# Patient Record
Sex: MALE | Race: WHITE | NOT HISPANIC OR LATINO
[De-identification: names, ages, dates, MRNs, and addresses within clinical notes are randomized per-mention and may not be internally consistent; named-entity substitution may affect disease eponyms.]

---

## 2017-01-09 ENCOUNTER — APPOINTMENT (OUTPATIENT)
Dept: CARDIOLOGY | Facility: CLINIC | Age: 67
End: 2017-01-09

## 2017-01-17 ENCOUNTER — OUTPATIENT (OUTPATIENT)
Dept: OUTPATIENT SERVICES | Facility: HOSPITAL | Age: 67
LOS: 1 days | End: 2017-01-17

## 2017-02-09 ENCOUNTER — APPOINTMENT (OUTPATIENT)
Dept: PULMONOLOGY | Facility: CLINIC | Age: 67
End: 2017-02-09

## 2017-03-02 ENCOUNTER — APPOINTMENT (OUTPATIENT)
Dept: CARDIOLOGY | Facility: CLINIC | Age: 67
End: 2017-03-02

## 2017-03-02 VITALS
WEIGHT: 245 LBS | SYSTOLIC BLOOD PRESSURE: 158 MMHG | BODY MASS INDEX: 33.18 KG/M2 | HEIGHT: 72 IN | DIASTOLIC BLOOD PRESSURE: 88 MMHG

## 2017-03-02 DIAGNOSIS — Z86.39 PERSONAL HISTORY OF OTHER ENDOCRINE, NUTRITIONAL AND METABOLIC DISEASE: ICD-10-CM

## 2017-03-02 DIAGNOSIS — Z87.09 PERSONAL HISTORY OF OTHER DISEASES OF THE RESPIRATORY SYSTEM: ICD-10-CM

## 2017-03-02 DIAGNOSIS — Z80.3 FAMILY HISTORY OF MALIGNANT NEOPLASM OF BREAST: ICD-10-CM

## 2017-03-02 DIAGNOSIS — Z78.9 OTHER SPECIFIED HEALTH STATUS: ICD-10-CM

## 2017-03-02 DIAGNOSIS — N40.0 BENIGN PROSTATIC HYPERPLASIA WITHOUT LOWER URINARY TRACT SYMPMS: ICD-10-CM

## 2017-03-02 DIAGNOSIS — Z80.1 FAMILY HISTORY OF MALIGNANT NEOPLASM OF TRACHEA, BRONCHUS AND LUNG: ICD-10-CM

## 2017-03-02 DIAGNOSIS — M54.9 DORSALGIA, UNSPECIFIED: ICD-10-CM

## 2017-03-02 DIAGNOSIS — G89.29 DORSALGIA, UNSPECIFIED: ICD-10-CM

## 2017-03-02 RX ORDER — RAMIPRIL 10 MG/1
10 CAPSULE ORAL DAILY
Refills: 0 | Status: ACTIVE | COMMUNITY

## 2017-03-02 RX ORDER — TAMSULOSIN HYDROCHLORIDE 0.4 MG/1
0.4 CAPSULE ORAL
Refills: 0 | Status: ACTIVE | COMMUNITY

## 2017-03-02 RX ORDER — GLIMEPIRIDE 2 MG/1
2 TABLET ORAL
Refills: 0 | Status: ACTIVE | COMMUNITY

## 2017-03-02 RX ORDER — METFORMIN HYDROCHLORIDE 500 MG/1
500 TABLET, COATED ORAL
Refills: 0 | Status: ACTIVE | COMMUNITY

## 2017-03-02 RX ORDER — ASPIRIN 81 MG/1
81 TABLET, CHEWABLE ORAL DAILY
Refills: 0 | Status: ACTIVE | COMMUNITY

## 2017-04-03 ENCOUNTER — APPOINTMENT (OUTPATIENT)
Dept: CARDIOLOGY | Facility: CLINIC | Age: 67
End: 2017-04-03

## 2017-04-18 ENCOUNTER — OUTPATIENT (OUTPATIENT)
Dept: OUTPATIENT SERVICES | Facility: HOSPITAL | Age: 67
LOS: 1 days | End: 2017-04-18

## 2017-04-24 ENCOUNTER — OUTPATIENT (OUTPATIENT)
Dept: OUTPATIENT SERVICES | Facility: HOSPITAL | Age: 67
LOS: 1 days | End: 2017-04-24
Payer: COMMERCIAL

## 2017-04-24 PROCEDURE — 33282: CPT

## 2017-04-28 ENCOUNTER — APPOINTMENT (OUTPATIENT)
Dept: CARDIOLOGY | Facility: CLINIC | Age: 67
End: 2017-04-28

## 2017-05-25 ENCOUNTER — OUTPATIENT (OUTPATIENT)
Dept: OUTPATIENT SERVICES | Facility: HOSPITAL | Age: 67
LOS: 1 days | End: 2017-05-25

## 2017-06-02 ENCOUNTER — APPOINTMENT (OUTPATIENT)
Dept: CARDIOLOGY | Facility: CLINIC | Age: 67
End: 2017-06-02

## 2017-07-07 ENCOUNTER — APPOINTMENT (OUTPATIENT)
Dept: CARDIOLOGY | Facility: CLINIC | Age: 67
End: 2017-07-07

## 2017-08-11 ENCOUNTER — APPOINTMENT (OUTPATIENT)
Dept: CARDIOLOGY | Facility: CLINIC | Age: 67
End: 2017-08-11
Payer: MEDICARE

## 2017-08-11 PROCEDURE — 93299: CPT

## 2017-08-11 PROCEDURE — 93298 REM INTERROG DEV EVAL SCRMS: CPT

## 2017-08-28 ENCOUNTER — APPOINTMENT (OUTPATIENT)
Dept: CARDIOLOGY | Facility: CLINIC | Age: 67
End: 2017-08-28
Payer: MEDICARE

## 2017-08-28 PROCEDURE — 99214 OFFICE O/P EST MOD 30 MIN: CPT

## 2017-09-15 ENCOUNTER — APPOINTMENT (OUTPATIENT)
Dept: CARDIOLOGY | Facility: CLINIC | Age: 67
End: 2017-09-15
Payer: MEDICARE

## 2017-09-15 PROCEDURE — 93298 REM INTERROG DEV EVAL SCRMS: CPT

## 2017-09-15 PROCEDURE — 93299: CPT

## 2017-10-18 ENCOUNTER — APPOINTMENT (OUTPATIENT)
Dept: CARDIOLOGY | Facility: CLINIC | Age: 67
End: 2017-10-18
Payer: MEDICARE

## 2017-10-18 PROCEDURE — 93299: CPT

## 2017-10-18 PROCEDURE — 93298 REM INTERROG DEV EVAL SCRMS: CPT

## 2017-11-13 ENCOUNTER — OUTPATIENT (OUTPATIENT)
Dept: OUTPATIENT SERVICES | Facility: HOSPITAL | Age: 67
LOS: 1 days | End: 2017-11-13

## 2017-11-22 ENCOUNTER — OUTPATIENT (OUTPATIENT)
Dept: OUTPATIENT SERVICES | Facility: HOSPITAL | Age: 67
LOS: 1 days | End: 2017-11-22

## 2017-11-28 ENCOUNTER — APPOINTMENT (OUTPATIENT)
Dept: CARDIOLOGY | Facility: CLINIC | Age: 67
End: 2017-11-28
Payer: MEDICARE

## 2017-11-28 DIAGNOSIS — Z86.73 PERSONAL HISTORY OF TRANSIENT ISCHEMIC ATTACK (TIA), AND CEREBRAL INFARCTION W/OUT RESIDUAL DEFICITS: ICD-10-CM

## 2017-11-28 PROCEDURE — 93298 REM INTERROG DEV EVAL SCRMS: CPT

## 2017-11-28 PROCEDURE — 93299: CPT

## 2018-01-05 ENCOUNTER — APPOINTMENT (OUTPATIENT)
Dept: CARDIOLOGY | Facility: CLINIC | Age: 68
End: 2018-01-05
Payer: MEDICARE

## 2018-01-05 PROCEDURE — 93298 REM INTERROG DEV EVAL SCRMS: CPT

## 2018-01-05 PROCEDURE — 93299: CPT

## 2018-02-09 ENCOUNTER — APPOINTMENT (OUTPATIENT)
Dept: CARDIOLOGY | Facility: CLINIC | Age: 68
End: 2018-02-09
Payer: MEDICARE

## 2018-02-09 PROCEDURE — 93299: CPT

## 2018-02-09 PROCEDURE — 93298 REM INTERROG DEV EVAL SCRMS: CPT

## 2018-03-07 ENCOUNTER — OUTPATIENT (OUTPATIENT)
Dept: OUTPATIENT SERVICES | Facility: HOSPITAL | Age: 68
LOS: 1 days | End: 2018-03-07

## 2018-03-09 ENCOUNTER — OUTPATIENT (OUTPATIENT)
Dept: OUTPATIENT SERVICES | Facility: HOSPITAL | Age: 68
LOS: 1 days | End: 2018-03-09

## 2018-03-16 ENCOUNTER — APPOINTMENT (OUTPATIENT)
Dept: CARDIOLOGY | Facility: CLINIC | Age: 68
End: 2018-03-16
Payer: MEDICARE

## 2018-03-16 PROCEDURE — 93299: CPT

## 2018-03-16 PROCEDURE — 93298 REM INTERROG DEV EVAL SCRMS: CPT

## 2018-04-24 ENCOUNTER — APPOINTMENT (OUTPATIENT)
Dept: CARDIOLOGY | Facility: CLINIC | Age: 68
End: 2018-04-24
Payer: MEDICARE

## 2018-04-24 PROCEDURE — 93299: CPT

## 2018-04-24 PROCEDURE — 93298 REM INTERROG DEV EVAL SCRMS: CPT

## 2018-05-03 ENCOUNTER — OUTPATIENT (OUTPATIENT)
Dept: OUTPATIENT SERVICES | Facility: HOSPITAL | Age: 68
LOS: 1 days | End: 2018-05-03

## 2018-06-01 ENCOUNTER — APPOINTMENT (OUTPATIENT)
Dept: CARDIOLOGY | Facility: CLINIC | Age: 68
End: 2018-06-01
Payer: MEDICARE

## 2018-06-01 PROCEDURE — 93298 REM INTERROG DEV EVAL SCRMS: CPT

## 2018-06-01 PROCEDURE — 93299: CPT

## 2018-06-04 ENCOUNTER — APPOINTMENT (OUTPATIENT)
Dept: CARDIOLOGY | Facility: CLINIC | Age: 68
End: 2018-06-04
Payer: MEDICARE

## 2018-06-04 ENCOUNTER — NON-APPOINTMENT (OUTPATIENT)
Age: 68
End: 2018-06-04

## 2018-06-04 VITALS
OXYGEN SATURATION: 98 % | WEIGHT: 244 LBS | HEIGHT: 72 IN | HEART RATE: 88 BPM | SYSTOLIC BLOOD PRESSURE: 128 MMHG | BODY MASS INDEX: 33.05 KG/M2 | DIASTOLIC BLOOD PRESSURE: 70 MMHG

## 2018-06-04 PROCEDURE — 93000 ELECTROCARDIOGRAM COMPLETE: CPT

## 2018-06-04 PROCEDURE — 99214 OFFICE O/P EST MOD 30 MIN: CPT

## 2018-07-06 ENCOUNTER — APPOINTMENT (OUTPATIENT)
Dept: CARDIOLOGY | Facility: CLINIC | Age: 68
End: 2018-07-06
Payer: MEDICARE

## 2018-07-06 PROCEDURE — 93299: CPT

## 2018-07-06 PROCEDURE — 93298 REM INTERROG DEV EVAL SCRMS: CPT

## 2018-07-28 PROBLEM — Z86.73 HISTORY OF STROKE: Status: RESOLVED | Noted: 2017-03-02 | Resolved: 2018-07-28

## 2018-08-15 ENCOUNTER — APPOINTMENT (OUTPATIENT)
Dept: CARDIOLOGY | Facility: CLINIC | Age: 68
End: 2018-08-15
Payer: MEDICARE

## 2018-08-15 PROCEDURE — 93299: CPT

## 2018-08-15 PROCEDURE — 93298 REM INTERROG DEV EVAL SCRMS: CPT

## 2018-09-21 ENCOUNTER — APPOINTMENT (OUTPATIENT)
Dept: CARDIOLOGY | Facility: CLINIC | Age: 68
End: 2018-09-21
Payer: MEDICARE

## 2018-09-21 PROCEDURE — 93299: CPT

## 2018-09-21 PROCEDURE — 93298 REM INTERROG DEV EVAL SCRMS: CPT

## 2018-10-26 ENCOUNTER — APPOINTMENT (OUTPATIENT)
Dept: CARDIOLOGY | Facility: CLINIC | Age: 68
End: 2018-10-26
Payer: MEDICARE

## 2018-10-26 PROCEDURE — 93298 REM INTERROG DEV EVAL SCRMS: CPT

## 2018-10-26 PROCEDURE — 93299: CPT

## 2018-11-30 ENCOUNTER — APPOINTMENT (OUTPATIENT)
Dept: CARDIOLOGY | Facility: CLINIC | Age: 68
End: 2018-11-30
Payer: MEDICARE

## 2018-11-30 PROCEDURE — 93299: CPT

## 2018-11-30 PROCEDURE — 93298 REM INTERROG DEV EVAL SCRMS: CPT

## 2018-11-30 NOTE — ASSESSMENT
[FreeTextEntry1] : Remote LNq Summary\par \par 11-30-18 \par Viewed lnq summary 8-13-18 to 9-16-18. \par No events were noted. \par Next summary on PA schedule 32days--cv

## 2018-11-30 NOTE — ADDENDUM
[FreeTextEntry1] : Please note I personally reviewed the above with PA.  I was physically present during the service of the patient and directly involved in the management plan and recommendations of care for the patient.\par

## 2019-01-04 ENCOUNTER — APPOINTMENT (OUTPATIENT)
Dept: CARDIOLOGY | Facility: CLINIC | Age: 69
End: 2019-01-04
Payer: MEDICARE

## 2019-01-04 PROCEDURE — 93299: CPT

## 2019-01-04 PROCEDURE — 93298 REM INTERROG DEV EVAL SCRMS: CPT

## 2019-01-04 NOTE — ASSESSMENT
[FreeTextEntry1] : Remote Lnq Summary\par \par 1-4-19 \par Viewed lnq summary 9-16-18 to 10-16-18. \par No events noted. \par Next summary on PA schedule 32days-cv

## 2019-02-08 ENCOUNTER — APPOINTMENT (OUTPATIENT)
Dept: CARDIOLOGY | Facility: CLINIC | Age: 69
End: 2019-02-08
Payer: MEDICARE

## 2019-02-08 PROCEDURE — 93298 REM INTERROG DEV EVAL SCRMS: CPT

## 2019-02-08 PROCEDURE — 93299: CPT

## 2019-02-08 NOTE — ASSESSMENT
[FreeTextEntry1] : Remote LNq Summary\par \par 2-8-19 \par Viewed lnq summary 10-16-18 to 11-17-18. \par No episodes were noted. \par Next summary on [facility] 32days--cv

## 2019-03-15 ENCOUNTER — OTHER (OUTPATIENT)
Age: 69
End: 2019-03-15

## 2019-03-15 ENCOUNTER — APPOINTMENT (OUTPATIENT)
Dept: CARDIOLOGY | Facility: CLINIC | Age: 69
End: 2019-03-15
Payer: MEDICARE

## 2019-03-15 PROCEDURE — 93298 REM INTERROG DEV EVAL SCRMS: CPT

## 2019-03-15 PROCEDURE — 93299: CPT

## 2019-03-15 NOTE — ASSESSMENT
[FreeTextEntry1] : Remote Lnq Summary\par \par 3-15-19 \par Viewed lnq summary 11-17-18 12-18-18. \par No events were noted. \par Next summary on PA schedule 32days--cv

## 2019-03-29 ENCOUNTER — TRANSCRIPTION ENCOUNTER (OUTPATIENT)
Age: 69
End: 2019-03-29

## 2019-04-19 ENCOUNTER — APPOINTMENT (OUTPATIENT)
Dept: CARDIOLOGY | Facility: CLINIC | Age: 69
End: 2019-04-19
Payer: MEDICARE

## 2019-04-19 PROCEDURE — 93298 REM INTERROG DEV EVAL SCRMS: CPT

## 2019-04-19 PROCEDURE — 93299: CPT

## 2019-04-19 NOTE — ASSESSMENT
[FreeTextEntry1] : Remote Lnq Summary\par \par \par 4-19-19 \par Viewed lnq summary 12-18-18 to 1-18-19. \par No events were noted. \par Next summary on PA schedule 32days--cv

## 2019-05-24 ENCOUNTER — APPOINTMENT (OUTPATIENT)
Dept: CARDIOLOGY | Facility: CLINIC | Age: 69
End: 2019-05-24
Payer: MEDICARE

## 2019-05-24 PROCEDURE — 93299: CPT

## 2019-05-24 PROCEDURE — 93298 REM INTERROG DEV EVAL SCRMS: CPT

## 2019-05-24 NOTE — ASSESSMENT
[FreeTextEntry1] : Remote LNq Summary\par \par \par 5-24-19 \par Viewed lnq summary 1-18-19 to 2-19-19 and 2-19-19 to 3-22-19 (to catch up on automatic cycle, approved by Mallory).\par No events were noted. \par Next summary on PA schedule 32days--cv

## 2019-06-03 ENCOUNTER — APPOINTMENT (OUTPATIENT)
Dept: CARDIOLOGY | Facility: CLINIC | Age: 69
End: 2019-06-03
Payer: MEDICARE

## 2019-06-03 ENCOUNTER — NON-APPOINTMENT (OUTPATIENT)
Age: 69
End: 2019-06-03

## 2019-06-03 VITALS
OXYGEN SATURATION: 95 % | WEIGHT: 238 LBS | HEART RATE: 82 BPM | BODY MASS INDEX: 32.28 KG/M2 | DIASTOLIC BLOOD PRESSURE: 68 MMHG | SYSTOLIC BLOOD PRESSURE: 126 MMHG

## 2019-06-03 PROCEDURE — 99215 OFFICE O/P EST HI 40 MIN: CPT

## 2019-06-03 PROCEDURE — 93000 ELECTROCARDIOGRAM COMPLETE: CPT

## 2019-06-03 NOTE — DISCUSSION/SUMMARY
[FreeTextEntry1] : 68 year old white male comes in for followup consultation with above medical history inactive. Medical problems as noted below.\par #1 coronary artery disease. Abnormal stress myocardial perfusion scan. Multivessel PCI in [place]. Details not available.\par Improved symptoms since then.\par Clinically, no signs of unstable, CAD, CHF.\par requested information from [place].\par Reviewed pathophysiology.\par Aspirin lifelong.\par dual Antiplatelet agents for at least one year. He is not sure about the second medication besides aspirin. He understands importance of taking all the medications as otherwise for prevention of future cardiovascular events. He understands the high risk of stent complication leading to increased morbidity, mortality.\par Continue aggressive lifestyle and respect modifications for prevention of future cardiovascular events have been reviewed and as noted below.\par #2  essential hypertension. Continue present medications. Lifestyle modifications p.o. goal less than 130/80.\par #3 hyperlipidemia. Atorvastatin, increased to 40 mg daily. Goal would be to keep  cholesterol less than 70. role of high intensified therapy of atorvastatin in presence of recent intervention were reviewed. He will repeat CMP, fasting lipid panel in 2 months. He'll contact me for any significant side effects.\par #4 history of CVA.  implantable loop recorder. No significant atrial fibrillation noted.\par #5 obesity. weight reduction. Strongly recommended.\par #6 type 2 diabetes mellitus. Last hemoglobin A1c 7.0. Continue aggressive management of his diabetes mellitus is recommended. Consider use of new diabetic medications, which has more beneficial effect from cardiac point of view. I will defer that to your office, and endocrinologist.\par \par \par Counseling regarding low saturated fat, salt and carbohydrate intake was reviewed. Active lifestyle and regular. Exercise along with weight management is advised.\par All the above were at length reviewed. Answered all the questions. Thank you very much for this kind referral. Please do not hesitate to give me a call for any question.\par Part of this transcription was done with voice recognition software and phonetically similar errors are common. I apologize for that. Please donot hesitate to call for any questions due to above.\par \par Followup is planned in 6 months. Labs prescription given. Prescription done.\par Request for data and information from [place] done.\par He is to also provide me with list of his medications

## 2019-06-03 NOTE — PHYSICAL EXAM
[General Appearance - Well Developed] : well developed [General Appearance - Well Nourished] : well nourished [Well Groomed] : well groomed [Normal Appearance] : normal appearance [No Deformities] : no deformities [General Appearance - In No Acute Distress] : no acute distress [Eyelids - No Xanthelasma] : the eyelids demonstrated no xanthelasmas [Normal Conjunctiva] : the conjunctiva exhibited no abnormalities [No Oral Pallor] : no oral pallor [No Oral Cyanosis] : no oral cyanosis [No Jugular Venous Victoria A Waves] : no jugular venous victoria A waves [Normal Jugular Venous V Waves Present] : normal jugular venous V waves present [Normal Jugular Venous A Waves Present] : normal jugular venous A waves present [Exaggerated Use Of Accessory Muscles For Inspiration] : no accessory muscle use [Respiration, Rhythm And Depth] : normal respiratory rhythm and effort [Heart Sounds] : normal S1 and S2 [Auscultation Breath Sounds / Voice Sounds] : lungs were clear to auscultation bilaterally [Heart Rate And Rhythm] : heart rate and rhythm were normal [Murmurs] : no murmurs present [Arterial Pulses Normal] : the arterial pulses were normal [Veins - Varicosity Changes] : no varicosital changes were noted in the lower extremities [Edema] : no peripheral edema present [Abdomen Tenderness] : non-tender [Abdomen Soft] : soft [Abnormal Walk] : normal gait [Abdomen Mass (___ Cm)] : no abdominal mass palpated [Gait - Sufficient For Exercise Testing] : the gait was sufficient for exercise testing [Nail Clubbing] : no clubbing of the fingernails [Cyanosis, Localized] : no localized cyanosis [Petechial Hemorrhages (___cm)] : no petechial hemorrhages [Skin Color & Pigmentation] : normal skin color and pigmentation [] : no ischemic changes [No Venous Stasis] : no venous stasis [No Skin Ulcers] : no skin ulcer [Skin Lesions] : no skin lesions [Affect] : the affect was normal [Oriented To Time, Place, And Person] : oriented to person, place, and time [No Xanthoma] : no  xanthoma was observed [No Anxiety] : not feeling anxious [Mood] : the mood was normal

## 2019-06-03 NOTE — ASSESSMENT
[FreeTextEntry1] : Reviewed on [place] 3, 2019\par EKG. [place] 3, 2019 normal sinus rhythm. Low voltage complexes. Nonspecific ST-T changes. Indication coronary artery disease. Interpretation by me.\par Labs May 22, 2019. LDL 83, HDL 51, total cholesterol 162, triglycerides 185. Hemoglobin A1c 7.0, potassium 4.5, sodium 137, creatinine 0.61. CBC stable.

## 2019-06-03 NOTE — HISTORY OF PRESENT ILLNESS
[FreeTextEntry1] : Medical history includes\par #1 essential hypertension. Left ventricular hypertrophy. History of CVA. Implantable loop recorder without any significant events in the form of atrial fibrillations.\par Blood pressure has been well controlled without history of congestive heart failure, renal insufficiency.\par His nonsmoker.\par #2 hyperlipidemia. On the lower dosage of atorvastatin at 10 mg p.o.\par #3 obesity.\par #4 type 2 diabetes mellitus. Borderline control.\par #5 nonobstructive carotid atherosclerosis\par #6 coronary artery disease. PCI in [place] after abnormal stress myocardial perfusion scan.\par \par \par

## 2019-06-26 ENCOUNTER — APPOINTMENT (OUTPATIENT)
Dept: CARDIOLOGY | Facility: CLINIC | Age: 69
End: 2019-06-26
Payer: MEDICARE

## 2019-06-26 PROCEDURE — 93298 REM INTERROG DEV EVAL SCRMS: CPT

## 2019-06-26 PROCEDURE — 93299: CPT

## 2019-06-26 NOTE — ASSESSMENT
[FreeTextEntry1] : \par 6-26-19 [facility] LINQ summary:\par DOI : [id number]\par Dx: CVA\par \par  3-22-19 through 4-22-19:\par \par No events are noted. \par \par Next summary 32 days. \par \par \par Sincerely,\par \par Caroline Gibson MSP\par

## 2019-07-02 ENCOUNTER — APPOINTMENT (OUTPATIENT)
Dept: RADIOLOGY | Facility: CLINIC | Age: 69
End: 2019-07-02
Payer: MEDICARE

## 2019-07-02 PROCEDURE — 73562 X-RAY EXAM OF KNEE 3: CPT | Mod: 50

## 2019-07-29 ENCOUNTER — APPOINTMENT (OUTPATIENT)
Dept: CARDIOLOGY | Facility: CLINIC | Age: 69
End: 2019-07-29
Payer: MEDICARE

## 2019-07-29 PROCEDURE — 93298 REM INTERROG DEV EVAL SCRMS: CPT

## 2019-07-29 PROCEDURE — 93299: CPT

## 2019-07-29 NOTE — ASSESSMENT
[FreeTextEntry1] : Remote LNq Summary\par \par 7-29-19\par Viewed lnq summary 4-22-19 to 5-23-19. \par No events were noted. \par NExt summary on PA schedule 32days--cv

## 2019-09-06 ENCOUNTER — APPOINTMENT (OUTPATIENT)
Dept: CARDIOLOGY | Facility: CLINIC | Age: 69
End: 2019-09-06
Payer: MEDICARE

## 2019-09-06 PROCEDURE — 93298 REM INTERROG DEV EVAL SCRMS: CPT

## 2019-09-06 PROCEDURE — 93299: CPT

## 2019-09-08 NOTE — ASSESSMENT
[FreeTextEntry1] : Remote LNq Summary\par \par 9-6-19 \par Viewed lnq summary 5-23-19 to 6-23-19.\par  No events were noted. \par Next summary on pa schedule 32days-cv

## 2019-10-16 ENCOUNTER — APPOINTMENT (OUTPATIENT)
Dept: CARDIOLOGY | Facility: CLINIC | Age: 69
End: 2019-10-16
Payer: MEDICARE

## 2019-10-16 PROCEDURE — 93299: CPT

## 2019-10-16 PROCEDURE — 93298 REM INTERROG DEV EVAL SCRMS: CPT

## 2019-10-16 NOTE — ASSESSMENT
[FreeTextEntry1] : Remote LNq Summary\par \par \par 10-16-19\par Viewed lnq summary 6-23-19 to 7-24-19. \par No events were noted. \par Next summary on pa schedule 32days-cv

## 2019-11-14 ENCOUNTER — APPOINTMENT (OUTPATIENT)
Dept: CARDIOLOGY | Facility: CLINIC | Age: 69
End: 2019-11-14
Payer: MEDICARE

## 2019-11-14 VITALS
HEART RATE: 67 BPM | WEIGHT: 245 LBS | OXYGEN SATURATION: 94 % | HEIGHT: 72 IN | SYSTOLIC BLOOD PRESSURE: 130 MMHG | DIASTOLIC BLOOD PRESSURE: 72 MMHG | BODY MASS INDEX: 33.18 KG/M2

## 2019-11-14 PROCEDURE — 99214 OFFICE O/P EST MOD 30 MIN: CPT

## 2019-11-14 NOTE — PHYSICAL EXAM
[General Appearance - Well Developed] : well developed [Normal Appearance] : normal appearance [General Appearance - Well Nourished] : well nourished [Well Groomed] : well groomed [No Deformities] : no deformities [Normal Conjunctiva] : the conjunctiva exhibited no abnormalities [General Appearance - In No Acute Distress] : no acute distress [No Oral Pallor] : no oral pallor [Eyelids - No Xanthelasma] : the eyelids demonstrated no xanthelasmas [No Oral Cyanosis] : no oral cyanosis [Normal Jugular Venous A Waves Present] : normal jugular venous A waves present [Normal Jugular Venous V Waves Present] : normal jugular venous V waves present [No Jugular Venous Victoria A Waves] : no jugular venous victoria A waves [Respiration, Rhythm And Depth] : normal respiratory rhythm and effort [Exaggerated Use Of Accessory Muscles For Inspiration] : no accessory muscle use [Auscultation Breath Sounds / Voice Sounds] : lungs were clear to auscultation bilaterally [Heart Rate And Rhythm] : heart rate and rhythm were normal [Heart Sounds] : normal S1 and S2 [Murmurs] : no murmurs present [Arterial Pulses Normal] : the arterial pulses were normal [Edema] : no peripheral edema present [Veins - Varicosity Changes] : no varicosital changes were noted in the lower extremities [Abdomen Soft] : soft [Abdomen Tenderness] : non-tender [Abdomen Mass (___ Cm)] : no abdominal mass palpated [Abnormal Walk] : normal gait [Gait - Sufficient For Exercise Testing] : the gait was sufficient for exercise testing [Nail Clubbing] : no clubbing of the fingernails [Cyanosis, Localized] : no localized cyanosis [Petechial Hemorrhages (___cm)] : no petechial hemorrhages [] : no rash [Skin Color & Pigmentation] : normal skin color and pigmentation [Skin Lesions] : no skin lesions [No Venous Stasis] : no venous stasis [No Skin Ulcers] : no skin ulcer [No Xanthoma] : no  xanthoma was observed [Oriented To Time, Place, And Person] : oriented to person, place, and time [Affect] : the affect was normal [Mood] : the mood was normal [No Anxiety] : not feeling anxious

## 2019-11-14 NOTE — ASSESSMENT
[FreeTextEntry1] : Reviewed on [place] 3, 2019\par EKG. [place] 3, 2019 normal sinus rhythm. Low voltage complexes. Nonspecific ST-T changes. Indication coronary artery disease. Interpretation by me.\par Labs May 22, 2019. LDL 83, HDL 51, total cholesterol 162, triglycerides 185. Hemoglobin A1c 7.0, potassium 4.5, sodium 137, creatinine 0.61. CBC stable.\par \par Reviewed 1114 2019. Loop recorder of readings were reviewed. No significant paroxysmal atrial fibrillation

## 2019-11-14 NOTE — DISCUSSION/SUMMARY
[FreeTextEntry1] : 69 year old white male comes in for followup consultation with above medical history And active medical problems as noted\par #1 coronary artery disease. Abnormal stress myocardial perfusion scan. Multivessel PCI in [place]. Appears to be stable without any symptoms. Activity level, limited because of arthritic knee. Exertional dyspnea without any clinical signs of congestive heart failure.\par He will have an echocardiogram for evaluation of ejection fraction left atrial size. Pulmonary pressures and presence of coronary disease, hypertension, possible CVA.\par This will be also important prior to surgical intervention for moderate risk surgery and presence of limitation of activity and exercise.\par \par Aspirin lifelong.\par I increased his statin to 80 mg in attempt to lower LDL cholesterol to less than 70\par Continue aggressive lifestyle and respect modifications for prevention of future cardiovascular events have been reviewed and as noted below.\par #2  essential hypertension. Continue present medications. Lifestyle modifications  goal less than 130/80.\par #3 hyperlipidemia. I increased atorvastatin to 80 mg. Goal is to try to bring the LDL cholesterol to less than 70. If any, side effects. He'll contact me. Labs in about 2-3 months with liver function tests\par #4 history of CVA.  implantable loop recorder. No significant atrial fibrillation noted.\par #5 obesity. weight reduction. Strongly recommended.\par #6 type 2 diabetes mellitus.According to him, uncontrolled. Strongly recommend lowering hemoglobin A1c. Relationship with cardiovascular disease discussed with him at length.\par \par Counseling regarding low saturated fat, salt and carbohydrate intake was reviewed. Active lifestyle and regular. Exercise along with weight management is advised.\par All the above were at length reviewed. Answered all the questions. Thank you very much for this kind referral. Please do not hesitate to give me a call for any question.\par Part of this transcription was done with voice recognition software and phonetically similar errors are common. I apologize for that. Please donot hesitate to call for any questions due to above.\par \par Followup is planned in 6 months.

## 2019-11-14 NOTE — REASON FOR VISIT
[Follow-Up - Clinic] : a clinic follow-up of [Coronary Artery Disease] : coronary artery disease [Hypertension] : hypertension [Hyperlipidemia] : hyperlipidemia [FreeTextEntry1] : 69-year-old white male is seen in the office for consultation To review labs and evaluation, and presence of recent multivessel PCI in [place]. Also, followup on the implantable loop recorder.\par He has been aggressive with fishing, and activity without any complaint of chest pain. He does have exertional dyspnea, but without any PND, orthopnea, pedal edema.\par He is trying to control his carbohydrate intake, but unable to lose weight.\par He also denies any significant palpitation, dizziness, or syncopal event.\par No neurological event.\par Denies any claudication pain.\par According to him. His diabetes is not well controlled\par He has arthritic knee problems. Which is limiting his functional status. Planning to have knee replacement.

## 2019-11-20 ENCOUNTER — APPOINTMENT (OUTPATIENT)
Dept: CARDIOLOGY | Facility: CLINIC | Age: 69
End: 2019-11-20
Payer: MEDICARE

## 2019-11-20 PROCEDURE — 93299: CPT

## 2019-11-20 PROCEDURE — 93298 REM INTERROG DEV EVAL SCRMS: CPT

## 2019-11-20 NOTE — ASSESSMENT
[FreeTextEntry1] : Remote LNq summary\par \par 11-20-19 \par Viewed lnq summary 7-24-19 to 8-24-19. \par No events were noted.\par  Next summary on pa schedule 32days-cv

## 2019-12-04 ENCOUNTER — APPOINTMENT (OUTPATIENT)
Dept: RADIOLOGY | Facility: CLINIC | Age: 69
End: 2019-12-04
Payer: MEDICARE

## 2019-12-04 PROCEDURE — 71046 X-RAY EXAM CHEST 2 VIEWS: CPT

## 2019-12-11 ENCOUNTER — APPOINTMENT (OUTPATIENT)
Dept: CARDIOLOGY | Facility: CLINIC | Age: 69
End: 2019-12-11
Payer: MEDICARE

## 2019-12-11 PROCEDURE — 93306 TTE W/DOPPLER COMPLETE: CPT

## 2019-12-18 ENCOUNTER — APPOINTMENT (OUTPATIENT)
Dept: CARDIOLOGY | Facility: CLINIC | Age: 69
End: 2019-12-18
Payer: MEDICARE

## 2019-12-18 ENCOUNTER — NON-APPOINTMENT (OUTPATIENT)
Age: 69
End: 2019-12-18

## 2019-12-18 VITALS
OXYGEN SATURATION: 96 % | DIASTOLIC BLOOD PRESSURE: 86 MMHG | SYSTOLIC BLOOD PRESSURE: 160 MMHG | WEIGHT: 248 LBS | HEART RATE: 82 BPM | HEIGHT: 72 IN | BODY MASS INDEX: 33.59 KG/M2

## 2019-12-18 PROCEDURE — 99214 OFFICE O/P EST MOD 30 MIN: CPT | Mod: 25

## 2019-12-18 PROCEDURE — 93000 ELECTROCARDIOGRAM COMPLETE: CPT

## 2019-12-18 RX ORDER — CLOPIDOGREL BISULFATE 75 MG/1
75 TABLET, FILM COATED ORAL
Refills: 0 | Status: ACTIVE | COMMUNITY
Start: 2019-12-18

## 2019-12-18 RX ORDER — AMLODIPINE BESYLATE 5 MG/1
5 TABLET ORAL DAILY
Qty: 90 | Refills: 3 | Status: ACTIVE | COMMUNITY
Start: 2019-12-18

## 2019-12-18 RX ORDER — METOPROLOL SUCCINATE 25 MG/1
25 TABLET, EXTENDED RELEASE ORAL DAILY
Qty: 90 | Refills: 3 | Status: ACTIVE | COMMUNITY
Start: 2019-12-18

## 2019-12-18 NOTE — REASON FOR VISIT
[Coronary Artery Disease] : coronary artery disease [Follow-Up - Clinic] : a clinic follow-up of [Hypertension] : hypertension [Hyperlipidemia] : hyperlipidemia [FreeTextEntry1] : 69-year-old white male is seen in the office for consultation To review labs and evaluation, and presence of recent multivessel PCI in [place]. Also, followup on the implantable loop recorder.\par He has been aggressive with fishing, and activity without any complaint of chest pain. He does have exertional dyspnea, but without any PND, orthopnea, pedal edema.\par He is trying to control his carbohydrate intake, but unable to lose weight.\par He also denies any significant palpitation, dizziness, or syncopal event.\par No neurological event.\par Denies any claudication pain.\par According to him. His diabetes is not well controlled\par He has arthritic knee problems. Which is limiting his functional status. Planning to have knee replacement.

## 2019-12-18 NOTE — PHYSICAL EXAM
[General Appearance - Well Developed] : well developed [Normal Appearance] : normal appearance [General Appearance - Well Nourished] : well nourished [Well Groomed] : well groomed [Normal Conjunctiva] : the conjunctiva exhibited no abnormalities [No Deformities] : no deformities [General Appearance - In No Acute Distress] : no acute distress [No Oral Cyanosis] : no oral cyanosis [Eyelids - No Xanthelasma] : the eyelids demonstrated no xanthelasmas [No Oral Pallor] : no oral pallor [Normal Jugular Venous A Waves Present] : normal jugular venous A waves present [No Jugular Venous Victoria A Waves] : no jugular venous victoria A waves [Normal Jugular Venous V Waves Present] : normal jugular venous V waves present [Respiration, Rhythm And Depth] : normal respiratory rhythm and effort [Heart Sounds] : normal S1 and S2 [Auscultation Breath Sounds / Voice Sounds] : lungs were clear to auscultation bilaterally [Heart Rate And Rhythm] : heart rate and rhythm were normal [Exaggerated Use Of Accessory Muscles For Inspiration] : no accessory muscle use [Murmurs] : no murmurs present [Arterial Pulses Normal] : the arterial pulses were normal [Edema] : no peripheral edema present [Veins - Varicosity Changes] : no varicosital changes were noted in the lower extremities [Abdomen Soft] : soft [Abnormal Walk] : normal gait [Gait - Sufficient For Exercise Testing] : the gait was sufficient for exercise testing [Petechial Hemorrhages (___cm)] : no petechial hemorrhages [Cyanosis, Localized] : no localized cyanosis [Nail Clubbing] : no clubbing of the fingernails [] : no rash [Skin Color & Pigmentation] : normal skin color and pigmentation [No Venous Stasis] : no venous stasis [Skin Lesions] : no skin lesions [No Skin Ulcers] : no skin ulcer [Mood] : the mood was normal [Affect] : the affect was normal [No Xanthoma] : no  xanthoma was observed [Oriented To Time, Place, And Person] : oriented to person, place, and time [No Anxiety] : not feeling anxious

## 2019-12-18 NOTE — DISCUSSION/SUMMARY
[FreeTextEntry1] : 69 year old white male comes in for clearance prior to partial right knee replacement on 1/16/2019.\par #1 coronary artery disease. Abnormal stress myocardial perfusion scan. Multivessel PCI in [place] in February 2019. Appears to be stable without any symptoms. Activity level, limited because of arthritic knee.  No exertional dyspnea, chest pain without any clinical signs of congestive heart failure.\par Aspirin lifelong.\par Continue atorvastatin 80 mg in attempt to lower LDL cholesterol to less than 70\par Continue aggressive lifestyle and respect modifications for prevention of future cardiovascular events have been reviewed and as noted below.\par #2  essential hypertension. Continue present medications. Lifestyle modifications  goal less than 130/80.\par #3 hyperlipidemia. I increased atorvastatin to 80 mg. Goal is to try to bring the LDL cholesterol to less than 70. If any, side effects. He'll contact me. Labs in about 2-3 months with liver function tests\par #4 history of CVA.  implantable loop recorder. No significant atrial fibrillation noted.\par #5 obesity. weight reduction. Strongly recommended.\par #6 type 2 diabetes mellitus.According to him, uncontrolled. Strongly recommend lowering hemoglobin A1c. Relationship with cardiovascular disease discussed with him at length.\par \par \par Patient is optimized from a cardiology standpoint to undergo his partial right knee replacement on 1/16/2020 if it can be performed on both Aspirin and Plavix. He had multiple PCI performed the end of February 2019 and should be on dual antiplatelet therapy for 1 year ( until end of February 2020). If surgery is not able to be performed on both Aspirin and Plavix I would recommend this elective surgery [facility] when Plavix can be safely discontinued and patient can have the surgery on Aspirin 81mg daily. \par \par Patient also with loop recorder and states he needs OK for MRI to be done on his back. Please note loop recorders are MRI compatible and he can undergo the MRI of his back.

## 2019-12-20 ENCOUNTER — APPOINTMENT (OUTPATIENT)
Dept: CARDIOLOGY | Facility: CLINIC | Age: 69
End: 2019-12-20
Payer: MEDICARE

## 2019-12-20 PROCEDURE — 93298 REM INTERROG DEV EVAL SCRMS: CPT

## 2019-12-20 PROCEDURE — 93299: CPT

## 2019-12-26 NOTE — ASSESSMENT
[FreeTextEntry1] : Remote LNq Summary\par \par \par 12-20-19 \par Viewed lnq summary 8-24-19 to 9-24-19. \par No events were noted. \par Next summary on pa schedule 32days-cv

## 2020-01-09 ENCOUNTER — RX RENEWAL (OUTPATIENT)
Age: 70
End: 2020-01-09

## 2020-01-24 ENCOUNTER — APPOINTMENT (OUTPATIENT)
Dept: CARDIOLOGY | Facility: CLINIC | Age: 70
End: 2020-01-24
Payer: MEDICARE

## 2020-01-24 PROCEDURE — G2066: CPT

## 2020-01-24 PROCEDURE — 93298 REM INTERROG DEV EVAL SCRMS: CPT

## 2020-01-24 NOTE — ASSESSMENT
[FreeTextEntry1] : Remote Lnq Summary\par \par 1-24-20 \par Viewed lnq summary 9-24-19 to 10-22-19. \par No events were noted. \par Next summary on pa schedule 32days-cv

## 2020-02-28 ENCOUNTER — APPOINTMENT (OUTPATIENT)
Dept: CARDIOLOGY | Facility: CLINIC | Age: 70
End: 2020-02-28
Payer: MEDICARE

## 2020-02-28 PROCEDURE — G2066: CPT

## 2020-02-28 PROCEDURE — 93298 REM INTERROG DEV EVAL SCRMS: CPT

## 2020-02-28 NOTE — ASSESSMENT
[FreeTextEntry1] : Remote Lnq summary\par \par 2-28-20 \par Viewed lnq summary 10-22-19 to 11-26-19 and 11-26-19 to 12-27-19 (to catch up on automatic cycle, approved by Mallory). \par No events were noted. \par Next summary on pa schedule 32days-cv

## 2020-04-02 ENCOUNTER — APPOINTMENT (OUTPATIENT)
Dept: CARDIOLOGY | Facility: CLINIC | Age: 70
End: 2020-04-02
Payer: MEDICARE

## 2020-04-02 PROCEDURE — 93298 REM INTERROG DEV EVAL SCRMS: CPT

## 2020-04-02 PROCEDURE — G2066: CPT

## 2020-04-02 NOTE — ASSESSMENT
[FreeTextEntry1] : Remote LNq Summary\par \par \par 4-2-20 \par Viewed lnq Summary 12-27-19 to 1-26-20.\par  No events were noted.\par  Next summary on pa schedule 32days-cv\par

## 2020-04-07 ENCOUNTER — APPOINTMENT (OUTPATIENT)
Dept: CARDIOLOGY | Facility: CLINIC | Age: 70
End: 2020-04-07

## 2020-05-07 ENCOUNTER — APPOINTMENT (OUTPATIENT)
Dept: CARDIOLOGY | Facility: CLINIC | Age: 70
End: 2020-05-07
Payer: MEDICARE

## 2020-05-07 PROCEDURE — 93298 REM INTERROG DEV EVAL SCRMS: CPT

## 2020-05-07 PROCEDURE — G2066: CPT

## 2020-05-07 NOTE — ASSESSMENT
[FreeTextEntry1] : Remote LNq Summary\par \par 5-7-20 \par Viewed lnq summary 1-26-20 to 2-27-20. \par No events were noted. \par Next summary on pa schedule 32days-cv

## 2020-06-15 ENCOUNTER — APPOINTMENT (OUTPATIENT)
Dept: CARDIOLOGY | Facility: CLINIC | Age: 70
End: 2020-06-15
Payer: MEDICARE

## 2020-06-15 VITALS
HEIGHT: 72 IN | OXYGEN SATURATION: 98 % | TEMPERATURE: 98 F | DIASTOLIC BLOOD PRESSURE: 68 MMHG | WEIGHT: 238 LBS | SYSTOLIC BLOOD PRESSURE: 130 MMHG | BODY MASS INDEX: 32.23 KG/M2 | HEART RATE: 72 BPM

## 2020-06-15 PROCEDURE — 99214 OFFICE O/P EST MOD 30 MIN: CPT

## 2020-06-15 NOTE — DISCUSSION/SUMMARY
[FreeTextEntry1] : 69 year old white male comes in for followup consultation with above medical history And active medical problems as noted\par #1 coronary artery disease. Abnormal stress myocardial perfusion scan. Multivessel PCI in [place]. Appears to be stable without any symptoms. Activity level, limited because of arthritic knee. Exertional dyspnea without any clinical signs of congestive heart failure.\par Clinically stable.\par Aspirin lifelong.  On dual antiplatelet agent because of high risk nature of his coronary artery disease with multivessel PCI and diabetes.  No significant bleeding complications.  High risk medication use.  Follow closely.\par Continue atorvastatin 80 mg.  Will obtain labs which were done in your office.  Goal for LDL cholesterol less than 70\par Continue aggressive lifestyle and respect modifications for prevention of future cardiovascular events have been reviewed and as noted below.\par #2  essential hypertension.  No CHF.  No history of CKD.  Non-smoker.  Continue present medications. Lifestyle modifications  goal less than 130/80.\par #3 hyperlipidemia. I increased atorvastatin to 80 mg. Goal is to try to bring the LDL cholesterol to less than 70. If any, side effects. He'll contact me. Labs in about 2-3 months with liver function tests\par #4 history of CVA.  implantable loop recorder. No significant atrial fibrillation noted.  Continue to follow\par #5 obesity. weight reduction. Strongly recommended.\par #6 type 2 diabetes mellitus.According to him, uncontrolled. Strongly recommend lowering hemoglobin A1c to less than 7.  Continue follow-up with your office regarding management.  Relationship with cardiovascular disease discussed with him at length.\par \par Counseling regarding low saturated fat, salt and carbohydrate intake was reviewed. Active lifestyle and regular. Exercise along with weight management is advised.\par All the above were at length reviewed. Answered all the questions. Thank you very much for this kind referral. Please do not hesitate to give me a call for any question.\par Part of this transcription was done with voice recognition software and phonetically similar errors are common. I apologize for that. Please donot hesitate to call for any questions due to above.\par \par Followup is planned in 6 months.

## 2020-06-15 NOTE — PHYSICAL EXAM
[General Appearance - Well Developed] : well developed [Normal Appearance] : normal appearance [Well Groomed] : well groomed [General Appearance - Well Nourished] : well nourished [No Deformities] : no deformities [General Appearance - In No Acute Distress] : no acute distress [Normal Conjunctiva] : the conjunctiva exhibited no abnormalities [Eyelids - No Xanthelasma] : the eyelids demonstrated no xanthelasmas [No Oral Cyanosis] : no oral cyanosis [No Oral Pallor] : no oral pallor [No Jugular Venous Victoria A Waves] : no jugular venous victoria A waves [Normal Jugular Venous A Waves Present] : normal jugular venous A waves present [Normal Jugular Venous V Waves Present] : normal jugular venous V waves present [Respiration, Rhythm And Depth] : normal respiratory rhythm and effort [Exaggerated Use Of Accessory Muscles For Inspiration] : no accessory muscle use [Auscultation Breath Sounds / Voice Sounds] : lungs were clear to auscultation bilaterally [Heart Sounds] : normal S1 and S2 [Heart Rate And Rhythm] : heart rate and rhythm were normal [Edema] : no peripheral edema present [Arterial Pulses Normal] : the arterial pulses were normal [Murmurs] : no murmurs present [Veins - Varicosity Changes] : no varicosital changes were noted in the lower extremities [Abdomen Soft] : soft [Abdomen Mass (___ Cm)] : no abdominal mass palpated [Abdomen Tenderness] : non-tender [FreeTextEntry1] : Arthritic gait [Nail Clubbing] : no clubbing of the fingernails [Petechial Hemorrhages (___cm)] : no petechial hemorrhages [Cyanosis, Localized] : no localized cyanosis [Skin Color & Pigmentation] : normal skin color and pigmentation [No Venous Stasis] : no venous stasis [Skin Lesions] : no skin lesions [] : no rash [No Skin Ulcers] : no skin ulcer [No Xanthoma] : no  xanthoma was observed [Oriented To Time, Place, And Person] : oriented to person, place, and time [Affect] : the affect was normal [No Anxiety] : not feeling anxious [Mood] : the mood was normal

## 2020-06-15 NOTE — REASON FOR VISIT
[Follow-Up - Clinic] : a clinic follow-up of [Coronary Artery Disease] : coronary artery disease [Hypertension] : hypertension [Hyperlipidemia] : hyperlipidemia [FreeTextEntry1] : 69-year-old gentleman.  Since last seen comes in for follow-up on labs, implantable loop recorder.\par He has been aggressive with fishing, and activity without any complaint of chest pain. He does have exertional dyspnea, but without any PND, orthopnea, pedal edema.\par He is trying to control his carbohydrate intake, but unable to lose weight.\par He also denies any significant palpitation, dizziness, or syncopal event.\par No neurological event.\par Denies any claudication pain.\par According to him. His diabetes is not well controlled\par He has arthritic knee problems. Which is limiting his functional status. Planning to have knee replacement probably in November.

## 2020-06-16 ENCOUNTER — APPOINTMENT (OUTPATIENT)
Dept: CARDIOLOGY | Facility: CLINIC | Age: 70
End: 2020-06-16
Payer: MEDICARE

## 2020-06-16 PROCEDURE — G2066: CPT

## 2020-06-16 PROCEDURE — 93298 REM INTERROG DEV EVAL SCRMS: CPT

## 2020-06-16 NOTE — ASSESSMENT
[FreeTextEntry1] : Remote Lnq Summary\par \par \par 6-16-20 \par Viewed lnq summary 2-27-20 to 3-28-20. \par No events were noted.\par  Next summary on pa schedule 32d-cv

## 2020-07-21 ENCOUNTER — APPOINTMENT (OUTPATIENT)
Dept: CARDIOLOGY | Facility: CLINIC | Age: 70
End: 2020-07-21
Payer: MEDICARE

## 2020-07-21 PROCEDURE — G2066: CPT

## 2020-07-21 PROCEDURE — 93298 REM INTERROG DEV EVAL SCRMS: CPT

## 2020-07-21 NOTE — ASSESSMENT
[FreeTextEntry1] : Remote LNq summary\par \par 7-21-20 \par Viewed lnq summary 3-28-20 to 4-29-20. \par No events were noted.\par  Next summary on pa schedule 32days-cv

## 2020-08-25 ENCOUNTER — APPOINTMENT (OUTPATIENT)
Dept: CARDIOLOGY | Facility: CLINIC | Age: 70
End: 2020-08-25
Payer: MEDICARE

## 2020-08-25 PROCEDURE — G2066: CPT

## 2020-08-25 PROCEDURE — 93298 REM INTERROG DEV EVAL SCRMS: CPT

## 2020-08-25 NOTE — ASSESSMENT
[FreeTextEntry1] : Remote LNq summary\par \par \par 8-25-20 \par \par Viewed lnq summary 4-29-20 to 5-30-20, 5-30-20 to 6-30-20 and 6-30-20 to 8-4-20 (approved by admin). \par Event labeled AF, noted p waves, r/w PD no AF, noted pvc's and v-bigeminy. \par Next summary on pa schedule 32d-cv\par

## 2020-09-29 DIAGNOSIS — R79.89 OTHER SPECIFIED ABNORMAL FINDINGS OF BLOOD CHEMISTRY: ICD-10-CM

## 2020-10-01 ENCOUNTER — RECORD ABSTRACTING (OUTPATIENT)
Age: 70
End: 2020-10-01

## 2020-10-05 ENCOUNTER — APPOINTMENT (OUTPATIENT)
Dept: CARDIOLOGY | Facility: CLINIC | Age: 70
End: 2020-10-05
Payer: MEDICARE

## 2020-10-05 ENCOUNTER — NON-APPOINTMENT (OUTPATIENT)
Age: 70
End: 2020-10-05

## 2020-10-05 VITALS
HEART RATE: 77 BPM | WEIGHT: 240 LBS | OXYGEN SATURATION: 97 % | SYSTOLIC BLOOD PRESSURE: 122 MMHG | HEIGHT: 72 IN | BODY MASS INDEX: 32.51 KG/M2 | DIASTOLIC BLOOD PRESSURE: 64 MMHG

## 2020-10-05 PROCEDURE — 93000 ELECTROCARDIOGRAM COMPLETE: CPT

## 2020-10-05 PROCEDURE — 99214 OFFICE O/P EST MOD 30 MIN: CPT

## 2020-10-05 RX ORDER — NITROGLYCERIN 0.4 MG/1
0.4 TABLET SUBLINGUAL
Qty: 21 | Refills: 0 | Status: ACTIVE | COMMUNITY
Start: 2020-10-05 | End: 1900-01-01

## 2020-10-05 NOTE — REVIEW OF SYSTEMS
[Negative] : Heme/Lymph [see HPI] : see HPI [Shortness Of Breath] : shortness of breath [Chest Pain] : chest pain [FreeTextEntry1] : Lightheadedness

## 2020-10-05 NOTE — PHYSICAL EXAM
[General Appearance - Well Developed] : well developed [Normal Appearance] : normal appearance [Well Groomed] : well groomed [General Appearance - Well Nourished] : well nourished [No Deformities] : no deformities [General Appearance - In No Acute Distress] : no acute distress [Normal Conjunctiva] : the conjunctiva exhibited no abnormalities [Eyelids - No Xanthelasma] : the eyelids demonstrated no xanthelasmas [Normal Oral Mucosa] : normal oral mucosa [No Oral Pallor] : no oral pallor [No Oral Cyanosis] : no oral cyanosis [Normal Jugular Venous A Waves Present] : normal jugular venous A waves present [Normal Jugular Venous V Waves Present] : normal jugular venous V waves present [No Jugular Venous Victoria A Waves] : no jugular venous victoria A waves [Respiration, Rhythm And Depth] : normal respiratory rhythm and effort [Exaggerated Use Of Accessory Muscles For Inspiration] : no accessory muscle use [Auscultation Breath Sounds / Voice Sounds] : lungs were clear to auscultation bilaterally [Heart Rate And Rhythm] : heart rate and rhythm were normal [Heart Sounds] : normal S1 and S2 [Murmurs] : no murmurs present [Abdomen Soft] : soft [Abdomen Tenderness] : non-tender [Abdomen Mass (___ Cm)] : no abdominal mass palpated [Abnormal Walk] : normal gait [Gait - Sufficient For Exercise Testing] : the gait was sufficient for exercise testing [Nail Clubbing] : no clubbing of the fingernails [Cyanosis, Localized] : no localized cyanosis [Petechial Hemorrhages (___cm)] : no petechial hemorrhages [Skin Color & Pigmentation] : normal skin color and pigmentation [] : no rash [No Venous Stasis] : no venous stasis [Skin Lesions] : no skin lesions [No Skin Ulcers] : no skin ulcer [No Xanthoma] : no  xanthoma was observed [Oriented To Time, Place, And Person] : oriented to person, place, and time [Affect] : the affect was normal [Mood] : the mood was normal [No Anxiety] : not feeling anxious

## 2020-10-05 NOTE — HISTORY OF PRESENT ILLNESS
[FreeTextEntry1] : \par SANCHEZ DAMON is a 70 year old male with a PMH of:\par \par \par Medical history includes\par #1 essential hypertension. Left ventricular hypertrophy. History of CVA. Implantable loop recorder without any significant events in the form of atrial fibrillations.\par Blood pressure has been well controlled without history of congestive heart failure, renal insufficiency.\par His nonsmoker.\par #2 hyperlipidemia. On the lower dosage of atorvastatin at 10 mg p.o.\par #3 obesity.\par #4 type 2 diabetes mellitus. Borderline control.\par #5 nonobstructive carotid atherosclerosis\par #6 coronary artery disease. Multiple PCI in [place] after abnormal stress myocardial perfusion scan.\par Has ILR.\par #7 He has arthritic knee problems. Which is limiting his functional status. Planning to have knee replacement probably in November. \par \par Last seen by Dr. Mendoza 6/15/20. Battery RRT 7/26/20. No events since last seen. Reports chest pain with exertion (nonradiating), CAMPUZANO, and lightheadedness with activities. He denies palpitations, orthopnea, LE edema, lightheadedness, dizziness, near syncope or syncope. 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 Former smoker. Quit 50 years ago. \par \par Testing:\par \par EKG 10/5/20: SR at 69 bpm, DC interval 184 ms, QTc 405 ms, Q waves in aVL and V1, nonspecific ST-T wave abnormalities \par \par Labs 4/3/20: CRP 11, ESR 44, TSH 2.660Chol 148, HDL 37, LDL 41, Trigs 352, AST 24, ALT 20, Cr 0.68, K 4.4, Hgb 11.8, HCT 38.1\par \par Echo 12/11/19:  EF 60-65%. Aortic root 4.4 cm. Ascending aorta 3.9 cm. Minimal to mild VHD. Mildly dilated LA. Normal wall motion. Mild AI noted since 10/26/16.\par \par Reviewed on Ondina 3, 2019\par EKG. Ondina 3, 2019 normal sinus rhythm. Low voltage complexes. Nonspecific ST-T changes. Indication coronary artery disease. Interpretation by me.\par \par Labs May 22, 2019. LDL 83, HDL 51, total cholesterol 162, triglycerides 185. Hemoglobin A1c 7.0, potassium 4.5, sodium 137, creatinine 0.61. CBC stable.\par \par Reviewed 11/14/2019. Loop recorder of readings were reviewed. No significant paroxysmal atrial fibrillation. \par \par Carotids 2016: Scattered atheromatous plaque at the origin of the right and left internal carotid arteries without hemodynamically significant stenosis.\par \par

## 2020-10-05 NOTE — ASSESSMENT
[FreeTextEntry1] : SANCHEZ DAMON is a 70 year old M who presents today Oct 05, 2020 with the above history and the following active issues:\par \par #1 coronary artery disease s/p multivessel PCI now with sx's of exertional CP and CAMPUZANO, and lightheadedness. Script for Nitro SL given. Obtain 2d echocardiogram to evaluate resting heart structure, valvular function, and LVEF. \par Obtain a nuclear stress test to evaluate for evidence of myocardial ischemia. This will be done off Metoprolol.\par \par Aspirin lifelong. On dual antiplatelet agent because of high risk nature of his coronary artery disease with multivessel PCI and diabetes. No significant bleeding complications. High risk medication use. Follow closely.\par Continue atorvastatin 80 mg. Goal for LDL cholesterol less than 70.\par \par Continue aggressive lifestyle and respect modifications for prevention of future cardiovascular events have been reviewed and as noted below.\par \par #2 essential hypertension. No CHF. No history of CKD. Non-smoker. Continue present medications. Lifestyle modifications goal less than 130/80.\par \par #3 hyperlipidemia. atorvastatin to 80 mg. Goal is to try to bring the LDL cholesterol to less than 70. If any, side effects. \par \par Obtain carotid ultrasound to evaluate for evidence of significant carotid atherosclerosis or obstruction. \par Obtain abdominal ultrasound to evaluate for aortic aneurysm or significant aortic atherosclerosis. \par \par #4 history of CVA. implantable loop recorder. No significant atrial fibrillation noted. LINQ at EOS as of 7/26/20. Recommend explant and reimplant. EP referral given. Patient states he would like to think about it.\par \par #5 obesity. weight reduction. Strongly recommended.\par \par #6 type 2 diabetes mellitus. According to him, uncontrolled. Strongly recommend lowering hemoglobin A1c to less than \par \par 7. Continue follow-up with your office regarding management. Relationship with cardiovascular disease discussed with him at length.\par \par F/U after testing to review results (echo, carotid, abd, nuclear stress test).\par Discussed red flag symptoms, which would warrant sooner or emergent medical evaluation.\par Any questions and concerns were addressed and resolved.\par \par Sincerely,\par Mireille Walls [facility]-BC\par Patient's history, testing, and plan was reviewed with supervising physician, Dr. Teresa Mendoza.\par

## 2020-10-06 ENCOUNTER — APPOINTMENT (OUTPATIENT)
Dept: CARDIOLOGY | Facility: CLINIC | Age: 70
End: 2020-10-06
Payer: MEDICARE

## 2020-10-06 PROCEDURE — 93298 REM INTERROG DEV EVAL SCRMS: CPT

## 2020-10-06 PROCEDURE — G2066: CPT

## 2020-10-06 NOTE — ASSESSMENT
[FreeTextEntry1] : Remote LQN Summary\par \par \par 10-6-20 \par Viewed lnq summary 8-4-20 to 8-28-20. no events were noted.\par  Hit EOS on 8/17 (per past notes, pt plan to discuss explant at ov in dec).  RP 12/7 (RP made aware)\par Will leave on [facility] until explanted. \par Next summary 32d-cv

## 2020-11-10 ENCOUNTER — APPOINTMENT (OUTPATIENT)
Dept: CARDIOLOGY | Facility: CLINIC | Age: 70
End: 2020-11-10
Payer: MEDICARE

## 2020-11-10 PROCEDURE — 93298 REM INTERROG DEV EVAL SCRMS: CPT

## 2020-11-10 PROCEDURE — G2066: CPT

## 2020-11-10 NOTE — ASSESSMENT
[FreeTextEntry1] : Remote LNq Summary\par \par \par 11-10-20 \par Viewed lnq summary 8-28-20 to 9-14-20. \par No events were noted. \par Pending f/u with RJ 11/16\par Next summary on pa schedule 32d-cv

## 2020-11-12 ENCOUNTER — RX RENEWAL (OUTPATIENT)
Age: 70
End: 2020-11-12

## 2020-11-12 RX ORDER — ATORVASTATIN CALCIUM 80 MG/1
80 TABLET, FILM COATED ORAL
Qty: 90 | Refills: 3 | Status: ACTIVE | COMMUNITY
Start: 2020-11-12 | End: 1900-01-01

## 2020-11-13 ENCOUNTER — APPOINTMENT (OUTPATIENT)
Dept: CARDIOLOGY | Facility: CLINIC | Age: 70
End: 2020-11-13
Payer: MEDICARE

## 2020-11-13 PROCEDURE — A9502: CPT

## 2020-11-13 PROCEDURE — 93880 EXTRACRANIAL BILAT STUDY: CPT

## 2020-11-13 PROCEDURE — 78452 HT MUSCLE IMAGE SPECT MULT: CPT

## 2020-11-13 PROCEDURE — 93306 TTE W/DOPPLER COMPLETE: CPT

## 2020-11-13 PROCEDURE — 93015 CV STRESS TEST SUPVJ I&R: CPT

## 2020-11-13 PROCEDURE — 93979 VASCULAR STUDY: CPT

## 2020-11-16 ENCOUNTER — APPOINTMENT (OUTPATIENT)
Dept: CARDIOLOGY | Facility: CLINIC | Age: 70
End: 2020-11-16
Payer: MEDICARE

## 2020-11-16 ENCOUNTER — APPOINTMENT (OUTPATIENT)
Dept: ELECTROPHYSIOLOGY | Facility: CLINIC | Age: 70
End: 2020-11-16
Payer: MEDICARE

## 2020-11-16 VITALS
BODY MASS INDEX: 33.32 KG/M2 | DIASTOLIC BLOOD PRESSURE: 84 MMHG | HEIGHT: 72 IN | HEART RATE: 92 BPM | SYSTOLIC BLOOD PRESSURE: 158 MMHG | OXYGEN SATURATION: 97 % | TEMPERATURE: 97.3 F | WEIGHT: 246 LBS

## 2020-11-16 VITALS
WEIGHT: 246 LBS | HEIGHT: 72 IN | OXYGEN SATURATION: 97 % | HEART RATE: 89 BPM | BODY MASS INDEX: 33.32 KG/M2 | DIASTOLIC BLOOD PRESSURE: 70 MMHG | SYSTOLIC BLOOD PRESSURE: 142 MMHG

## 2020-11-16 PROCEDURE — 99214 OFFICE O/P EST MOD 30 MIN: CPT

## 2020-11-16 PROCEDURE — 99203 OFFICE O/P NEW LOW 30 MIN: CPT

## 2020-11-16 PROCEDURE — 93000 ELECTROCARDIOGRAM COMPLETE: CPT

## 2020-11-16 NOTE — HISTORY OF PRESENT ILLNESS
[FreeTextEntry1] : SANCHEZ DAMON is a 70 year old male with a PMH of:\par \par \par Medical history includes\par #1 essential hypertension. Left ventricular hypertrophy. History of CVA. Implantable loop recorder without any significant events in the form of atrial fibrillations.\par Blood pressure has been well controlled without history of congestive heart failure, renal insufficiency.\par His nonsmoker.\par #2 hyperlipidemia. On the lower dosage of atorvastatin at 10 mg p.o.\par #3 obesity.\par #4 type 2 diabetes mellitus. Borderline control.\par #5 nonobstructive carotid atherosclerosis\par #6 coronary artery disease. Multiple PCI in [place] after abnormal stress myocardial perfusion scan.\par Has ILR.\par #7 He has arthritic knee problems. Which is limiting his functional status. Planning to have knee replacement probably in November. \par \par \par Last seen 10/5/20. With complaints of CP, CAMPUZANO, and lightheadedness. Nitro SL given. Nuke, echo, Carotid, and abd ordered. Presents today, 11/16/20 to discuss results. See details below. He still reports some SOB, fatigue, fluttering in his chest, and dizziness at times. He states some of these sx's are similar to how he felt prior to his stents in [place]. Of note, LINTIESHA is at EOS and he has an apt with Dr. Beltran today to discuss explant and re-implent. (No events noted on summaries 10/6/20 and 11/10/20). 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 Mild MR, mild AR, aortic root 4.6 cm (mod dilated), ascending aorta 4.1 cm (mild dilated), aortic arch 3.3cm, moderately dilated LA, normal wall motion. a linear structure compatible with ca Chiari-network is identified in the right atrium (normal varient), mild TR. Increase in aortic root and ascending aorta size, MR, and LA volume since 12/11/19.\par \par Cartoid u/s 11/13/20: Nonobstructive dz.\par \par Abd u/s 11/13/20: No AAAA. There is an abd hernia repair mesh seen in the mid distal portion. Mild plaque seen throughout abd aorta.\par \par Nuclear stress test 11/13/20: Lexiscan. No evidence of ischemia by EKG.Occasional PVCs. Medium sized, moderate defect in inferior wall that is dixed, suggestive of diaphragmatic attenuation artifact. EF 50%.\par \par EKG 10/5/20: SR at 69 bpm, CO interval 184 ms, QTc 405 ms, Q waves in aVL and V1, nonspecific ST-T wave abnormalities \par \par Labs 4/3/20: CRP 11, ESR 44, TSH 2.660Chol 148, HDL 37, LDL 41, Trigs 352, AST 24, ALT 20, Cr 0.68, K 4.4, Hgb 11.8, HCT 38.1\par \par Echo 12/11/19: EF 60-65%. Aortic root 4.4 cm. Ascending aorta 3.9 cm. Minimal to mild VHD. Mildly dilated LA. Normal wall motion. Mild AI noted since 10/26/16.\par \par Reviewed on Ondina 3, 2019\par EKG. Ondina 3, 2019 normal sinus rhythm. Low voltage complexes. Nonspecific ST-T changes. Indication coronary artery disease. Interpretation by me.\par \par Labs May 22, 2019. LDL 83, HDL 51, total cholesterol 162, triglycerides 185. Hemoglobin A1c 7.0, potassium 4.5, sodium 137, creatinine 0.61. CBC stable.\par \par Reviewed 11/14/2019. Loop recorder of readings were reviewed. No significant paroxysmal atrial fibrillation. \par \par Carotids 2016: Scattered atheromatous plaque at the origin of the right and left internal carotid arteries without hemodynamically significant stenosis.\par \par

## 2020-11-16 NOTE — REVIEW OF SYSTEMS
[Negative] : Heme/Lymph [see HPI] : see HPI [Shortness Of Breath] : shortness of breath [Dyspnea on exertion] : dyspnea during exertion [Palpitations] : palpitations

## 2020-11-16 NOTE — ASSESSMENT
[FreeTextEntry1] : SANCHEZ DAMON is a 70 year old M who presents today Nov 16, 2020 with the above history and the following active issues:\par \par \par \par #1 coronary artery disease s/p multivessel PCI now with sx's of exertional CP and CAMPUZANO, and lightheadedness. Nuclear stress test with medium/moderate fixed defect and negative for ischemia, however patient is still symptomatic with complaints of SOB, fatigue, fluttering, and dizziness. Above testing has been reviewed with the patient. \par High risk noninvasive imaging. \par Discussed risk of major adverse cardiovascular events without further evaluation and management. \par Recommend cardiac catheterization.\par Discussed the risks, benefits, alternatives of invasive angiography.\par Discussed potential for revascularization, percutaneous v surgical\par All questions answered.\par If escalating sx or sx at rest then 911\par Daily aspirin therapy. \par Beta blocker therapy. \par \par Aspirin lifelong. On dual antiplatelet agent because of high risk nature of his coronary artery disease with multivessel PCI and diabetes. No significant bleeding complications. High risk medication use. Follow closely.\par Continue atorvastatin 80 mg. Goal for LDL cholesterol less than 70.\par \par Continue aggressive lifestyle and respect modifications for prevention of future cardiovascular events have been reviewed and as noted below.\par \par #2 essential hypertension in the setting of dilated aortic root and ascending arota. /80 on my exam. Continue Amlodipine and Ramipril at present. States he was recently 130/80 at his cardiac testing. No CHF. No history of CKD. Non-smoker. Continue present medications. Trend BPs at home. Lifestyle modifications goal less than 130/80. Will likely increase antihypertensive medication if BPs remain elevated once ischemic work up is complete.\par \par #3 hyperlipidemia. atorvastatin to 80 mg. Goal is to try to bring the LDL cholesterol to less than 70. \par \par \par #4 history of CVA. implantable loop recorder. No significant atrial fibrillation noted. LINQ at EOS as of 7/26/20. He has an apt today with EP Dr. Beltran to discuss.\par \par #5 obesity. weight reduction. Strongly recommended.\par \par #6 type 2 diabetes mellitus. According to him, uncontrolled. Strongly recommend lowering hemoglobin A1c to less than \par \par 7. Continue follow-up with your office regarding management. Relationship with cardiovascular disease discussed with him at length.\par \par Onoging f/u with PCP.\par \par F/U after cardiac cath.\par Discussed red flag symptoms, which would warrant sooner or emergent medical evaluation.\par Any questions and concerns were addressed and resolved.\par \par Sincerely,\par Mireille Walls FNP-BC\par Patient's history, testing, and plan was reviewed with supervising physician, Dr. Teresa Mendoza.\par

## 2020-11-21 ENCOUNTER — APPOINTMENT (OUTPATIENT)
Dept: DISASTER EMERGENCY | Facility: CLINIC | Age: 70
End: 2020-11-21

## 2020-11-22 ENCOUNTER — NON-APPOINTMENT (OUTPATIENT)
Age: 70
End: 2020-11-22

## 2020-11-22 LAB — SARS-COV-2 N GENE NPH QL NAA+PROBE: NOT DETECTED

## 2020-11-22 NOTE — PHYSICAL EXAM
[General Appearance - Well Developed] : well developed [General Appearance - In No Acute Distress] : no acute distress [Normal Jugular Venous V Waves Present] : normal jugular venous V waves present [Heart Rate And Rhythm] : heart rate and rhythm were normal [Heart Sounds] : normal S1 and S2 [Murmurs] : no murmurs present [Arterial Pulses Normal] : the arterial pulses were normal [Edema] : no peripheral edema present [Auscultation Breath Sounds / Voice Sounds] : lungs were clear to auscultation bilaterally [Abdomen Tenderness] : non-tender [Nail Clubbing] : no clubbing of the fingernails [Cyanosis, Localized] : no localized cyanosis [No Venous Stasis] : no venous stasis [Impaired Insight] : insight and judgment were intact [FreeTextEntry1] : obese

## 2020-11-22 NOTE — DISCUSSION/SUMMARY
[FreeTextEntry1] : He has had a prior CVA and ILR interrogations have not shown any arrhythmia.  His device is now at EOS.  When he has had prior symptoms of increased heartbeat no A. fib was noted.  We discussed the benefits of further monitoring and felt that since he is not had any arrhythmias over the last 3 years that he would not be mandatory.  Patient would prefer not to have a new implantable loop.  He would also prefer to leave the current loop recorder in and will decide on removal of later date.

## 2020-11-22 NOTE — REVIEW OF SYSTEMS
[Feeling Fatigued] : feeling fatigued [see HPI] : see HPI [Blurry Vision] : no blurred vision [Dyspnea on exertion] : not dyspnea during exertion [Cough] : no cough [Abdominal Pain] : no abdominal pain [Easy Bleeding] : no tendency for easy bleeding [Easy Bruising] : no tendency for easy bruising

## 2020-11-22 NOTE — HISTORY OF PRESENT ILLNESS
[FreeTextEntry1] : Patient is a 70-year-old man with a prior history of hypertension, previous history of CVA and status post ILR implant.  He has been feeling well and has not had any symptoms of arrhythmia.  Review of his record shows during the previous ILR interrogations no atrial fibrillation or significant bradycardia was noted.  His ILR is now at EOS and is here for discussion regarding removal.\par \par He has symptoms of mild fatigue with activities.  He gets occasional palpitation when lifting goes fast for a minute or 2 and goes back to baseline.  Previous interrogation during his episodes did not show any A. fib or SVT.  He has been on metoprolol ER 25 mg/day.  In addition the patient has been on aspirin and Plavix.\par \par He has a history of hyperlipidemia, increased BMI, type 2 diabetes, coronary artery disease and status post multiple PCI.  The patient denies chest pain or shortness of breath.\par \par Echocardiogram performed 11/13/2020 showed EF 65%, mild mitral regurgitation, moderately dilated left atrium with left atrial volume index 44 cc/m², no pericardial effusion and normal pulmonary pressures.\par \par A regadenoson stress test performed on 11/30/2020 showed evidence of diaphragmatic attenuation and no ischemia was noted.

## 2020-11-23 ENCOUNTER — NON-APPOINTMENT (OUTPATIENT)
Age: 70
End: 2020-11-23

## 2020-11-24 ENCOUNTER — OUTPATIENT (OUTPATIENT)
Dept: OUTPATIENT SERVICES | Facility: HOSPITAL | Age: 70
LOS: 1 days | End: 2020-11-24

## 2020-11-30 ENCOUNTER — APPOINTMENT (OUTPATIENT)
Dept: CARDIOLOGY | Facility: CLINIC | Age: 70
End: 2020-11-30

## 2020-12-07 ENCOUNTER — APPOINTMENT (OUTPATIENT)
Dept: CARDIOLOGY | Facility: CLINIC | Age: 70
End: 2020-12-07

## 2020-12-15 ENCOUNTER — NON-APPOINTMENT (OUTPATIENT)
Age: 70
End: 2020-12-15

## 2020-12-18 ENCOUNTER — APPOINTMENT (OUTPATIENT)
Dept: CARDIOLOGY | Facility: CLINIC | Age: 70
End: 2020-12-18

## 2020-12-21 ENCOUNTER — APPOINTMENT (OUTPATIENT)
Dept: CARDIOLOGY | Facility: CLINIC | Age: 70
End: 2020-12-21

## 2020-12-27 ENCOUNTER — APPOINTMENT (OUTPATIENT)
Dept: DISASTER EMERGENCY | Facility: CLINIC | Age: 70
End: 2020-12-27

## 2020-12-28 LAB — SARS-COV-2 N GENE NPH QL NAA+PROBE: NOT DETECTED

## 2020-12-29 ENCOUNTER — APPOINTMENT (OUTPATIENT)
Dept: CARDIOLOGY | Facility: CLINIC | Age: 70
End: 2020-12-29
Payer: MEDICARE

## 2020-12-29 ENCOUNTER — NON-APPOINTMENT (OUTPATIENT)
Age: 70
End: 2020-12-29

## 2020-12-29 VITALS
TEMPERATURE: 97.1 F | WEIGHT: 245 LBS | HEART RATE: 77 BPM | SYSTOLIC BLOOD PRESSURE: 154 MMHG | BODY MASS INDEX: 33.18 KG/M2 | OXYGEN SATURATION: 98 % | HEIGHT: 72 IN | DIASTOLIC BLOOD PRESSURE: 76 MMHG

## 2020-12-29 DIAGNOSIS — E66.9 OBESITY, UNSPECIFIED: ICD-10-CM

## 2020-12-29 DIAGNOSIS — Z98.890 OTHER SPECIFIED POSTPROCEDURAL STATES: ICD-10-CM

## 2020-12-29 DIAGNOSIS — E66.01 MORBID (SEVERE) OBESITY DUE TO EXCESS CALORIES: ICD-10-CM

## 2020-12-29 DIAGNOSIS — Z00.00 ENCOUNTER FOR GENERAL ADULT MEDICAL EXAMINATION W/OUT ABNORMAL FINDINGS: ICD-10-CM

## 2020-12-29 DIAGNOSIS — E11.9 TYPE 2 DIABETES MELLITUS W/OUT COMPLICATIONS: ICD-10-CM

## 2020-12-29 DIAGNOSIS — I25.10 ATHEROSCLEROTIC HEART DISEASE OF NATIVE CORONARY ARTERY W/OUT ANGINA PECTORIS: ICD-10-CM

## 2020-12-29 PROCEDURE — 93000 ELECTROCARDIOGRAM COMPLETE: CPT

## 2020-12-29 PROCEDURE — 99215 OFFICE O/P EST HI 40 MIN: CPT

## 2020-12-30 ENCOUNTER — OUTPATIENT (OUTPATIENT)
Dept: OUTPATIENT SERVICES | Facility: HOSPITAL | Age: 70
LOS: 1 days | End: 2020-12-30

## 2021-01-15 ENCOUNTER — NON-APPOINTMENT (OUTPATIENT)
Age: 71
End: 2021-01-15

## 2021-01-22 ENCOUNTER — APPOINTMENT (OUTPATIENT)
Dept: CARDIOLOGY | Facility: CLINIC | Age: 71
End: 2021-01-22

## 2021-03-08 ENCOUNTER — NON-APPOINTMENT (OUTPATIENT)
Age: 71
End: 2021-03-08

## 2021-03-08 ENCOUNTER — APPOINTMENT (OUTPATIENT)
Dept: CARDIOLOGY | Facility: CLINIC | Age: 71
End: 2021-03-08
Payer: MEDICARE

## 2021-03-08 VITALS
SYSTOLIC BLOOD PRESSURE: 124 MMHG | WEIGHT: 244 LBS | HEIGHT: 72 IN | BODY MASS INDEX: 33.05 KG/M2 | TEMPERATURE: 93.7 F | HEART RATE: 73 BPM | OXYGEN SATURATION: 96 % | DIASTOLIC BLOOD PRESSURE: 70 MMHG

## 2021-03-08 DIAGNOSIS — Z01.818 ENCOUNTER FOR OTHER PREPROCEDURAL EXAMINATION: ICD-10-CM

## 2021-03-08 PROCEDURE — 93000 ELECTROCARDIOGRAM COMPLETE: CPT

## 2021-03-08 PROCEDURE — 99214 OFFICE O/P EST MOD 30 MIN: CPT

## 2021-03-08 NOTE — PHYSICAL EXAM
[General Appearance - Well Developed] : well developed [Normal Appearance] : normal appearance [Well Groomed] : well groomed [General Appearance - Well Nourished] : well nourished [No Deformities] : no deformities [General Appearance - In No Acute Distress] : no acute distress [Normal Conjunctiva] : the conjunctiva exhibited no abnormalities [Eyelids - No Xanthelasma] : the eyelids demonstrated no xanthelasmas [No Oral Pallor] : no oral pallor [No Oral Cyanosis] : no oral cyanosis [Normal Jugular Venous A Waves Present] : normal jugular venous A waves present [Normal Jugular Venous V Waves Present] : normal jugular venous V waves present [No Jugular Venous Victoria A Waves] : no jugular venous victoria A waves [Respiration, Rhythm And Depth] : normal respiratory rhythm and effort [Exaggerated Use Of Accessory Muscles For Inspiration] : no accessory muscle use [Auscultation Breath Sounds / Voice Sounds] : lungs were clear to auscultation bilaterally [Heart Rate And Rhythm] : heart rate and rhythm were normal [Heart Sounds] : normal S1 and S2 [Murmurs] : no murmurs present [Abdomen Soft] : soft [Abdomen Tenderness] : non-tender [Abdomen Mass (___ Cm)] : no abdominal mass palpated [Abnormal Walk] : normal gait [Nail Clubbing] : no clubbing of the fingernails [Cyanosis, Localized] : no localized cyanosis [Petechial Hemorrhages (___cm)] : no petechial hemorrhages [Skin Color & Pigmentation] : normal skin color and pigmentation [] : no rash [No Venous Stasis] : no venous stasis [Skin Lesions] : no skin lesions [No Skin Ulcers] : no skin ulcer [No Xanthoma] : no  xanthoma was observed [Oriented To Time, Place, And Person] : oriented to person, place, and time [Affect] : the affect was normal [Mood] : the mood was normal [No Anxiety] : not feeling anxious

## 2021-03-08 NOTE — HISTORY OF PRESENT ILLNESS
[Preoperative Visit] : for a medical evaluation prior to surgery [Scheduled Procedure ___] : a [unfilled] [Date of Surgery ___] : on [unfilled] [Surgeon Name ___] : surgeon: [unfilled] [Stable] : Stable [de-identified] : at [facility] [FreeTextEntry1] : \par SANCHEZ DAMON is a 70 year old male with history of hypertension, obesity, dyslipidemia, diabetes on jardiance, PEDRITO on CPAP machine, minimal nonobstructive carotid disease , CAD with multiple PCI in [place] approx 3 yrs ago, degenerative joint disease, CVA approx 5 yrs ago with no evidence of AF on 3 yrs of ILR recording. \par \par In the fall, he was having symptoms of form of extreme fatigue, weakness, shortness of breath with minimal exertion; he was found to have severe anemia, likely iron deficiency. Now follows with hematology. \par \par His symptom has improved drastically. Exercises routinely. Able to climb 1-2 flights of stairs without exertional complaint.  Denies any chest pain, PND, orthopnea, diaphoresis, dizziness, palpitation, pedal edema, claudication symptoms.  Is very compliant medication glucose diet. \par \par EKG today 3/8/21 shows normal sinus rhythm with nonspecific ST-T wave abnormalities, this is unchanged from prior EKGs. \par \par Labs Feb '21 show normal H/H. \par \par Past testing for reference: \par Echo 11/13/20: EF 65%. Mild MR, mild AR, aortic root 4.6 cm (mod dilated), ascending aorta 4.1 cm (mild dilated), aortic arch 3.3cm, moderately dilated LA, normal wall motion. a linear structure compatible with ca Chiari-network is identified in the right atrium (normal varient), mild TR. Increase in aortic root and ascending aorta size, MR, and LA volume since 12/11/19.\par \par Cartoid u/s 11/13/20: Nonobstructive dz.\par \par Abd u/s 11/13/20: No AAA. There is an abd hernia repair mesh seen in the mid distal portion. Mild plaque seen throughout abd aorta.\par \par Nuclear stress test 11/13/20: Lexiscan. No evidence of ischemia by EKG.Occasional PVCs. Medium sized, moderate defect in inferior wall that is dixed, suggestive of diaphragmatic attenuation artifact. EF 50%.\par \par \par

## 2021-03-08 NOTE — ASSESSMENT
[FreeTextEntry1] : SANCHEZ DAMON is a 70 year old M who presents today Mar 08, 2021 for preop cardiac clearance for knee replacement with Dr. Quarles at [facility] [place] on 3/30/21.  Clinically no evidence of ACS or CHF; his symptoms improved after IV iron infusion.  Cardiac wise is optimized to proceed with the planned procedure.  Periop close monitor vitals. Please continue aspirin 81mg and beta blocker in the periop period, Plavix can be stopped 5 to 7 days prior to the procedure. DVT ppx per procotol. \par \par -Coronary artery disease s/p multivessel PCI -no evidence of ACS or CHF ; last myocardial perfusion scan showed fixed inferior wall defect suggestive of attenuation artifact.  Continue current medications.  He understands he has to be on aspirin lifelong.\par \par -Hypertension; controlled, low-salt diet, continue medications\par \par -Hyperlipidemia. Cont atorvastatin 80 mg. Goal is to try to bring the LDL cholesterol to less than 70. \par \par -History of CVA. On implantable loop recorder no significant atrial fibrillation noted. LINQ at [facility] as of 7/26/20. It has stopped recording. There is no plan for further monitoring as discussed with EP. \par \par -Obesity. weight reduction. \par \par -DM; long-term goals blood pressure less than 130/80, A1c less than 7, LDL less than 70; diabetic diet; continue current medications including Jardiance with concomitant cardiovascular benefits. \par \par Continue regular walking, compression medication, low-cholesterol diet.  \par \par Please do not hesitate to call for any questions or concerns. \par \par Sincerely,\par \par ELBERT Pedraza\par Patients history, testing, and plan reviewed with supervising MD: Dr. Jose Centeno \par

## 2021-03-16 ENCOUNTER — OUTPATIENT (OUTPATIENT)
Dept: OUTPATIENT SERVICES | Facility: HOSPITAL | Age: 71
LOS: 1 days | End: 2021-03-16

## 2021-03-27 ENCOUNTER — APPOINTMENT (OUTPATIENT)
Dept: DISASTER EMERGENCY | Facility: CLINIC | Age: 71
End: 2021-03-27

## 2021-03-28 LAB — SARS-COV-2 N GENE NPH QL NAA+PROBE: NOT DETECTED

## 2021-03-30 ENCOUNTER — OUTPATIENT (OUTPATIENT)
Dept: OUTPATIENT SERVICES | Facility: HOSPITAL | Age: 71
LOS: 1 days | End: 2021-03-30

## 2021-03-30 ENCOUNTER — INPATIENT (INPATIENT)
Facility: HOSPITAL | Age: 71
LOS: 2 days | Discharge: NURSING, REHAB OR LONG-TERM CARE | End: 2021-04-02
Payer: MEDICARE

## 2021-03-30 PROCEDURE — 73560 X-RAY EXAM OF KNEE 1 OR 2: CPT | Mod: 26,RT

## 2021-03-31 ENCOUNTER — OUTPATIENT (OUTPATIENT)
Dept: OUTPATIENT SERVICES | Facility: HOSPITAL | Age: 71
LOS: 1 days | End: 2021-03-31

## 2021-04-01 ENCOUNTER — OUTPATIENT (OUTPATIENT)
Dept: OUTPATIENT SERVICES | Facility: HOSPITAL | Age: 71
LOS: 1 days | End: 2021-04-01

## 2021-04-02 ENCOUNTER — OUTPATIENT (OUTPATIENT)
Dept: OUTPATIENT SERVICES | Facility: HOSPITAL | Age: 71
LOS: 1 days | End: 2021-04-02

## 2021-04-29 ENCOUNTER — APPOINTMENT (OUTPATIENT)
Dept: CARDIOLOGY | Facility: CLINIC | Age: 71
End: 2021-04-29

## 2023-03-10 ENCOUNTER — APPOINTMENT (OUTPATIENT)
Dept: OPHTHALMOLOGY | Facility: CLINIC | Age: 73
End: 2023-03-10

## 2023-09-15 ASSESSMENT — KOOS JR
GOING UP OR DOWN STAIRS: MODERATE
IMPORTED KOOS JR SCORE: 54.84
GOING UP OR DOWN STAIRS: SEVERE
KOOS JR RAW SCORE: 13
IMPORTED KOOS JR SCORE: 42.28
STRAIGHTENING KNEE FULLY: MODERATE
KOOS JR RAW SCORE: 18
KOOS JR RAW SCORE: 13
STRAIGHTENING KNEE FULLY: MILD
GOING UP OR DOWN STAIRS: SEVERE
BENDING TO THE FLOOR TO PICK UP OBJECT: EXTREME
HOW SEVERE IS YOUR KNEE STIFFNESS AFTER FIRST WAKING IN MORNING: SEVERE
STANDING UPRIGHT: MILD
RISING FROM SITTING: MODERATE
BENDING TO THE FLOOR TO PICK UP OBJECT: MILD
RISING FROM SITTING: SEVERE
IMPORTED LATERALITY: RIGHT
STANDING UPRIGHT: MODERATE
IMPORTED KOOS JR SCORE: 47.49
RISING FROM SITTING: SEVERE
RISING FROM SITTING: MILD
TWISING OR PIVOTING ON KNEE: SEVERE
GOING UP OR DOWN STAIRS: MODERATE
IMPORTED KOOS JR SCORE: 42.28
IMPORTED FORM: YES
RISING FROM SITTING: MODERATE
HOW SEVERE IS YOUR KNEE STIFFNESS AFTER FIRST WAKING IN MORNING: MODERATE
KOOS JR RAW SCORE: 16
HOW SEVERE IS YOUR KNEE STIFFNESS AFTER FIRST WAKING IN MORNING: MODERATE
KOOS JR RAW SCORE: 16
RISING FROM SITTING: MODERATE
IMPORTED FORM: YES
TWISING OR PIVOTING ON KNEE: MODERATE
STRAIGHTENING KNEE FULLY: MILD
TWISING OR PIVOTING ON KNEE: MODERATE
HOW SEVERE IS YOUR KNEE STIFFNESS AFTER FIRST WAKING IN MORNING: SEVERE
BENDING TO THE FLOOR TO PICK UP OBJECT: EXTREME
IMPORTED KOOS JR SCORE: 59.38
IMPORTED FORM: YES
STRAIGHTENING KNEE FULLY: MODERATE
GOING UP OR DOWN STAIRS: SEVERE
BENDING TO THE FLOOR TO PICK UP OBJECT: MODERATE
IMPORTED KOOS JR SCORE: 54.84
STANDING UPRIGHT: MODERATE
GOING UP OR DOWN STAIRS: SEVERE
KOOS JR RAW SCORE: 18
BENDING TO THE FLOOR TO PICK UP OBJECT: MODERATE
IMPORTED LATERALITY: RIGHT
IMPORTED KOOS JR SCORE: 59.38
RISING FROM SITTING: MODERATE
BENDING TO THE FLOOR TO PICK UP OBJECT: MILD
BENDING TO THE FLOOR TO PICK UP OBJECT: MODERATE
STANDING UPRIGHT: MILD
RISING FROM SITTING: MILD
IMPORTED KOOS JR SCORE: 47.49
TWISING OR PIVOTING ON KNEE: SEVERE
STRAIGHTENING KNEE FULLY: MILD
STRAIGHTENING KNEE FULLY: SEVERE
KOOS JR RAW SCORE: 11
TWISING OR PIVOTING ON KNEE: MODERATE
HOW SEVERE IS YOUR KNEE STIFFNESS AFTER FIRST WAKING IN MORNING: MODERATE
IMPORTED FORM: YES
KOOS JR RAW SCORE: 11
IMPORTED LATERALITY: RIGHT
BENDING TO THE FLOOR TO PICK UP OBJECT: MODERATE
STRAIGHTENING KNEE FULLY: SEVERE
STANDING UPRIGHT: MILD
IMPORTED LATERALITY: RIGHT
TWISING OR PIVOTING ON KNEE: MODERATE
STRAIGHTENING KNEE FULLY: MILD
STANDING UPRIGHT: MILD
HOW SEVERE IS YOUR KNEE STIFFNESS AFTER FIRST WAKING IN MORNING: MODERATE

## 2023-11-09 ENCOUNTER — NON-APPOINTMENT (OUTPATIENT)
Age: 73
End: 2023-11-09

## 2023-11-09 ENCOUNTER — APPOINTMENT (OUTPATIENT)
Dept: CARDIOLOGY | Facility: CLINIC | Age: 73
End: 2023-11-09
Payer: MEDICARE

## 2023-11-09 VITALS
OXYGEN SATURATION: 95 % | SYSTOLIC BLOOD PRESSURE: 128 MMHG | HEIGHT: 72 IN | DIASTOLIC BLOOD PRESSURE: 70 MMHG | HEART RATE: 90 BPM | BODY MASS INDEX: 32.51 KG/M2 | WEIGHT: 240 LBS

## 2023-11-09 DIAGNOSIS — E78.5 HYPERLIPIDEMIA, UNSPECIFIED: ICD-10-CM

## 2023-11-09 DIAGNOSIS — Z95.5 PRESENCE OF CORONARY ANGIOPLASTY IMPLANT AND GRAFT: ICD-10-CM

## 2023-11-09 DIAGNOSIS — I10 ESSENTIAL (PRIMARY) HYPERTENSION: ICD-10-CM

## 2023-11-09 DIAGNOSIS — I63.9 CEREBRAL INFARCTION, UNSPECIFIED: ICD-10-CM

## 2023-11-09 DIAGNOSIS — Z79.899 OTHER LONG TERM (CURRENT) DRUG THERAPY: ICD-10-CM

## 2023-11-09 DIAGNOSIS — I25.10 ATHEROSCLEROTIC HEART DISEASE OF NATIVE CORONARY ARTERY W/OUT ANGINA PECTORIS: ICD-10-CM

## 2023-11-09 PROCEDURE — 99215 OFFICE O/P EST HI 40 MIN: CPT

## 2023-11-09 PROCEDURE — 93000 ELECTROCARDIOGRAM COMPLETE: CPT

## 2023-11-09 RX ORDER — EMPAGLIFLOZIN 25 MG/1
TABLET, FILM COATED ORAL DAILY
Refills: 0 | Status: DISCONTINUED | COMMUNITY
End: 2023-11-09

## 2023-11-09 RX ORDER — SEMAGLUTIDE 2.68 MG/ML
8 INJECTION, SOLUTION SUBCUTANEOUS
Refills: 0 | Status: ACTIVE | COMMUNITY

## 2023-12-28 ENCOUNTER — APPOINTMENT (OUTPATIENT)
Dept: CARDIOLOGY | Facility: CLINIC | Age: 73
End: 2023-12-28
Payer: MEDICARE

## 2023-12-28 PROCEDURE — 93880 EXTRACRANIAL BILAT STUDY: CPT

## 2023-12-28 PROCEDURE — 93306 TTE W/DOPPLER COMPLETE: CPT

## 2024-01-04 ENCOUNTER — APPOINTMENT (OUTPATIENT)
Dept: CARDIOLOGY | Facility: CLINIC | Age: 74
End: 2024-01-04
Payer: MEDICARE

## 2024-01-04 PROCEDURE — 78452 HT MUSCLE IMAGE SPECT MULT: CPT

## 2024-01-04 PROCEDURE — A9502: CPT

## 2024-01-04 PROCEDURE — 93015 CV STRESS TEST SUPVJ I&R: CPT
